# Patient Record
Sex: FEMALE | Race: WHITE
[De-identification: names, ages, dates, MRNs, and addresses within clinical notes are randomized per-mention and may not be internally consistent; named-entity substitution may affect disease eponyms.]

---

## 2017-01-19 LAB
INR PPP: 3.1 (ref 0.9–1.1)
PROTHROMBIN TIME: 34.7 SECONDS (ref 9–12)

## 2017-01-20 ENCOUNTER — HOSPITAL ENCOUNTER (OUTPATIENT)
Dept: HOSPITAL 45 - C.LABWYN | Age: 82
Discharge: HOME | End: 2017-01-20
Attending: NURSE PRACTITIONER
Payer: COMMERCIAL

## 2017-01-20 DIAGNOSIS — Z79.01: ICD-10-CM

## 2017-01-20 DIAGNOSIS — Z51.81: Primary | ICD-10-CM

## 2017-01-24 ENCOUNTER — HOSPITAL ENCOUNTER (OUTPATIENT)
Dept: HOSPITAL 45 - C.LABWYN | Age: 82
Discharge: HOME | End: 2017-01-24
Attending: NURSE PRACTITIONER
Payer: COMMERCIAL

## 2017-01-24 DIAGNOSIS — Z79.01: Primary | ICD-10-CM

## 2017-01-24 LAB
INR PPP: 2.8 (ref 0.9–1.1)
PROTHROMBIN TIME: 31.4 SECONDS (ref 9–12)

## 2017-02-07 ENCOUNTER — HOSPITAL ENCOUNTER (OUTPATIENT)
Dept: HOSPITAL 45 - C.LABWYN | Age: 82
Discharge: HOME | End: 2017-02-07
Attending: INTERNAL MEDICINE
Payer: COMMERCIAL

## 2017-02-07 DIAGNOSIS — Z79.01: ICD-10-CM

## 2017-02-07 DIAGNOSIS — Z51.81: Primary | ICD-10-CM

## 2017-02-07 LAB
INR PPP: 1.7 (ref 0.9–1.1)
PROTHROMBIN TIME: 18.8 SECONDS (ref 9–12)

## 2017-02-14 ENCOUNTER — HOSPITAL ENCOUNTER (OUTPATIENT)
Dept: HOSPITAL 45 - C.LABWYN | Age: 82
Discharge: HOME | End: 2017-02-14
Attending: NURSE PRACTITIONER
Payer: COMMERCIAL

## 2017-02-14 DIAGNOSIS — I48.91: Primary | ICD-10-CM

## 2017-02-14 LAB
INR PPP: 2.3 (ref 0.9–1.1)
PROTHROMBIN TIME: 25.4 SECONDS (ref 9–12)

## 2017-02-28 ENCOUNTER — HOSPITAL ENCOUNTER (OUTPATIENT)
Dept: HOSPITAL 45 - C.LABWYN | Age: 82
Discharge: HOME | End: 2017-02-28
Attending: NURSE PRACTITIONER
Payer: COMMERCIAL

## 2017-02-28 DIAGNOSIS — Z79.01: Primary | ICD-10-CM

## 2017-02-28 DIAGNOSIS — Z51.81: ICD-10-CM

## 2017-02-28 LAB
INR PPP: 2 (ref 0.9–1.1)
PROTHROMBIN TIME: 22.2 SECONDS (ref 9–12)

## 2017-03-02 ENCOUNTER — HOSPITAL ENCOUNTER (OUTPATIENT)
Dept: HOSPITAL 45 - C.LABWYN | Age: 82
Discharge: HOME | End: 2017-03-02
Attending: NURSE PRACTITIONER
Payer: COMMERCIAL

## 2017-03-02 DIAGNOSIS — I10: Primary | ICD-10-CM

## 2017-03-02 LAB
ANION GAP SERPL CALC-SCNC: 8 MMOL/L (ref 3–11)
BUN SERPL-MCNC: 11 MG/DL (ref 7–18)
BUN/CREAT SERPL: 14.4 (ref 10–20)
CALCIUM SERPL-MCNC: 8.3 MG/DL (ref 8.5–10.1)
CHLORIDE SERPL-SCNC: 106 MMOL/L (ref 98–107)
CO2 SERPL-SCNC: 30 MMOL/L (ref 21–32)
CREAT SERPL-MCNC: 0.75 MG/DL (ref 0.6–1.2)
GLUCOSE SERPL-MCNC: 88 MG/DL (ref 70–99)
POTASSIUM SERPL-SCNC: 3.6 MMOL/L (ref 3.5–5.1)
SODIUM SERPL-SCNC: 144 MMOL/L (ref 136–145)

## 2017-03-21 ENCOUNTER — HOSPITAL ENCOUNTER (OUTPATIENT)
Dept: HOSPITAL 45 - C.LABWYN | Age: 82
Discharge: HOME | End: 2017-03-21
Attending: INTERNAL MEDICINE
Payer: COMMERCIAL

## 2017-03-21 DIAGNOSIS — I10: ICD-10-CM

## 2017-03-21 DIAGNOSIS — I50.9: Primary | ICD-10-CM

## 2017-03-21 LAB
INR PPP: 2 (ref 0.9–1.1)
PROTHROMBIN TIME: 22 SECONDS (ref 9–12)

## 2017-04-06 ENCOUNTER — HOSPITAL ENCOUNTER (OUTPATIENT)
Dept: HOSPITAL 45 - C.LABWYN | Age: 82
Discharge: HOME | End: 2017-04-06
Attending: INTERNAL MEDICINE
Payer: COMMERCIAL

## 2017-04-06 DIAGNOSIS — Z79.01: Primary | ICD-10-CM

## 2017-04-06 LAB
INR PPP: 2.2 (ref 0.9–1.1)
PROTHROMBIN TIME: 23.8 SECONDS (ref 9–12)

## 2017-05-11 ENCOUNTER — HOSPITAL ENCOUNTER (OUTPATIENT)
Dept: HOSPITAL 45 - C.LABWYN | Age: 82
Discharge: HOME | End: 2017-05-11
Attending: INTERNAL MEDICINE
Payer: COMMERCIAL

## 2017-05-11 DIAGNOSIS — Z79.01: Primary | ICD-10-CM

## 2017-05-11 LAB
INR PPP: 2.1 (ref 0.9–1.1)
PROTHROMBIN TIME: 22.7 SECONDS (ref 9–12)

## 2017-06-01 ENCOUNTER — HOSPITAL ENCOUNTER (OUTPATIENT)
Dept: HOSPITAL 45 - C.LABWYN | Age: 82
Discharge: HOME | End: 2017-06-01
Attending: INTERNAL MEDICINE
Payer: COMMERCIAL

## 2017-06-01 DIAGNOSIS — I48.91: Primary | ICD-10-CM

## 2017-06-01 LAB
INR PPP: 1.6 (ref 0.9–1.1)
PROTHROMBIN TIME: 17.2 SECONDS (ref 9–12)

## 2017-06-08 ENCOUNTER — HOSPITAL ENCOUNTER (OUTPATIENT)
Dept: HOSPITAL 45 - C.LABWYN | Age: 82
Discharge: HOME | End: 2017-06-08
Attending: INTERNAL MEDICINE
Payer: COMMERCIAL

## 2017-06-08 DIAGNOSIS — F03.90: ICD-10-CM

## 2017-06-08 DIAGNOSIS — F32.9: ICD-10-CM

## 2017-06-08 DIAGNOSIS — I10: Primary | ICD-10-CM

## 2017-06-08 DIAGNOSIS — I50.9: ICD-10-CM

## 2017-06-08 DIAGNOSIS — N39.41: ICD-10-CM

## 2017-06-08 LAB
INR PPP: 1.7 (ref 0.9–1.1)
PROTHROMBIN TIME: 18.9 SECONDS (ref 9–12)

## 2017-06-15 ENCOUNTER — HOSPITAL ENCOUNTER (OUTPATIENT)
Dept: HOSPITAL 45 - C.LABWYN | Age: 82
Discharge: HOME | End: 2017-06-15
Attending: INTERNAL MEDICINE
Payer: COMMERCIAL

## 2017-06-15 DIAGNOSIS — Z51.81: Primary | ICD-10-CM

## 2017-06-15 DIAGNOSIS — Z79.01: ICD-10-CM

## 2017-06-15 LAB
INR PPP: 1.7 (ref 0.9–1.1)
PROTHROMBIN TIME: 18.7 SECONDS (ref 9–12)

## 2017-07-06 ENCOUNTER — HOSPITAL ENCOUNTER (OUTPATIENT)
Dept: HOSPITAL 45 - C.LABWYN | Age: 82
Discharge: HOME | End: 2017-07-06
Attending: INTERNAL MEDICINE
Payer: COMMERCIAL

## 2017-07-06 DIAGNOSIS — I48.91: Primary | ICD-10-CM

## 2017-07-06 LAB
INR PPP: 1.6 (ref 0.9–1.1)
PROTHROMBIN TIME: 17.4 SECONDS (ref 9–12)

## 2017-07-20 ENCOUNTER — HOSPITAL ENCOUNTER (OUTPATIENT)
Dept: HOSPITAL 45 - C.LABWYN | Age: 82
Discharge: HOME | End: 2017-07-20
Attending: NURSE PRACTITIONER
Payer: COMMERCIAL

## 2017-07-20 DIAGNOSIS — I50.9: Primary | ICD-10-CM

## 2017-07-20 LAB
ANION GAP SERPL CALC-SCNC: 5 MMOL/L (ref 3–11)
BUN SERPL-MCNC: 14 MG/DL (ref 7–18)
BUN/CREAT SERPL: 16 (ref 10–20)
CALCIUM SERPL-MCNC: 8.6 MG/DL (ref 8.5–10.1)
CHLORIDE SERPL-SCNC: 108 MMOL/L (ref 98–107)
CO2 SERPL-SCNC: 30 MMOL/L (ref 21–32)
CREAT SERPL-MCNC: 0.85 MG/DL (ref 0.6–1.2)
EOSINOPHIL NFR BLD AUTO: 268 K/UL (ref 130–400)
GLUCOSE SERPL-MCNC: 84 MG/DL (ref 70–99)
HCT VFR BLD CALC: 39.3 % (ref 37–47)
MCH RBC QN AUTO: 28.3 PG (ref 25–34)
MCHC RBC AUTO-ENTMCNC: 31.6 G/DL (ref 32–36)
MCV RBC AUTO: 89.7 FL (ref 80–100)
PMV BLD AUTO: 9.6 FL (ref 7.4–10.4)
POTASSIUM SERPL-SCNC: 3.6 MMOL/L (ref 3.5–5.1)
RBC # BLD AUTO: 4.38 M/UL (ref 4.2–5.4)
SODIUM SERPL-SCNC: 143 MMOL/L (ref 136–145)
WBC # BLD AUTO: 8.33 K/UL (ref 4.8–10.8)

## 2017-08-18 ENCOUNTER — HOSPITAL ENCOUNTER (OUTPATIENT)
Dept: HOSPITAL 45 - C.LABWYN | Age: 82
Discharge: HOME | End: 2017-08-18
Attending: INTERNAL MEDICINE
Payer: COMMERCIAL

## 2017-08-18 DIAGNOSIS — I48.91: Primary | ICD-10-CM

## 2017-08-18 LAB
INR PPP: 2.3 (ref 0.9–1.1)
PROTHROMBIN TIME: 25.7 SECONDS (ref 9–12)

## 2017-08-25 ENCOUNTER — HOSPITAL ENCOUNTER (OUTPATIENT)
Dept: HOSPITAL 45 - C.LABWYN | Age: 82
Discharge: HOME | End: 2017-08-25
Attending: INTERNAL MEDICINE
Payer: COMMERCIAL

## 2017-08-25 DIAGNOSIS — Z51.81: Primary | ICD-10-CM

## 2017-08-25 DIAGNOSIS — Z79.01: ICD-10-CM

## 2017-08-25 LAB
INR PPP: 2.5 (ref 0.9–1.1)
PROTHROMBIN TIME: 27.6 SECONDS (ref 9–12)

## 2017-09-07 ENCOUNTER — HOSPITAL ENCOUNTER (OUTPATIENT)
Dept: HOSPITAL 45 - C.LABWYN | Age: 82
Discharge: HOME | End: 2017-09-07
Attending: NURSE PRACTITIONER
Payer: COMMERCIAL

## 2017-09-07 DIAGNOSIS — Z79.01: Primary | ICD-10-CM

## 2017-09-07 LAB
INR PPP: 2.2 (ref 0.9–1.1)
PROTHROMBIN TIME: 24.1 SECONDS (ref 9–12)

## 2017-09-28 ENCOUNTER — HOSPITAL ENCOUNTER (OUTPATIENT)
Dept: HOSPITAL 45 - C.LABWYN | Age: 82
Discharge: HOME | End: 2017-09-28
Attending: INTERNAL MEDICINE
Payer: COMMERCIAL

## 2017-09-28 DIAGNOSIS — Z51.81: Primary | ICD-10-CM

## 2017-09-28 DIAGNOSIS — Z79.01: ICD-10-CM

## 2017-09-28 LAB
INR PPP: 2.2 (ref 0.9–1.1)
PROTHROMBIN TIME: 24.7 SECONDS (ref 9–12)

## 2017-10-12 ENCOUNTER — HOSPITAL ENCOUNTER (OUTPATIENT)
Dept: HOSPITAL 45 - C.LABWYN | Age: 82
Discharge: HOME | End: 2017-10-12
Attending: INTERNAL MEDICINE
Payer: COMMERCIAL

## 2017-10-12 DIAGNOSIS — D64.9: Primary | ICD-10-CM

## 2017-10-12 DIAGNOSIS — M81.0: ICD-10-CM

## 2017-10-12 LAB
ANION GAP SERPL CALC-SCNC: 7 MMOL/L (ref 3–11)
BUN SERPL-MCNC: 11 MG/DL (ref 7–18)
BUN/CREAT SERPL: 15 (ref 10–20)
CALCIUM SERPL-MCNC: 8.2 MG/DL (ref 8.5–10.1)
CHLORIDE SERPL-SCNC: 102 MMOL/L (ref 98–107)
CO2 SERPL-SCNC: 29 MMOL/L (ref 21–32)
CREAT SERPL-MCNC: 0.73 MG/DL (ref 0.6–1.2)
EOSINOPHIL NFR BLD AUTO: 305 K/UL (ref 130–400)
GLUCOSE SERPL-MCNC: 99 MG/DL (ref 70–99)
HCT VFR BLD CALC: 39.5 % (ref 37–47)
MCH RBC QN AUTO: 27.8 PG (ref 25–34)
MCHC RBC AUTO-ENTMCNC: 31.4 G/DL (ref 32–36)
MCV RBC AUTO: 88.6 FL (ref 80–100)
PMV BLD AUTO: 9.6 FL (ref 7.4–10.4)
POTASSIUM SERPL-SCNC: 3.3 MMOL/L (ref 3.5–5.1)
RBC # BLD AUTO: 4.46 M/UL (ref 4.2–5.4)
SODIUM SERPL-SCNC: 138 MMOL/L (ref 136–145)
WBC # BLD AUTO: 11.16 K/UL (ref 4.8–10.8)

## 2017-10-19 ENCOUNTER — HOSPITAL ENCOUNTER (OUTPATIENT)
Dept: HOSPITAL 45 - C.LABWYN | Age: 82
Discharge: NURSING, REHAB OR LONG-TERM CARE | End: 2017-10-19
Attending: INTERNAL MEDICINE
Payer: COMMERCIAL

## 2017-10-19 DIAGNOSIS — Z79.01: Primary | ICD-10-CM

## 2017-10-19 LAB
INR PPP: 2.7 (ref 0.9–1.1)
PROTHROMBIN TIME: 30.3 SECONDS (ref 9–12)

## 2017-10-22 ENCOUNTER — HOSPITAL ENCOUNTER (EMERGENCY)
Dept: HOSPITAL 45 - C.EDB | Age: 82
Discharge: HOME | End: 2017-10-22
Payer: COMMERCIAL

## 2017-10-22 VITALS
BODY MASS INDEX: 37.83 KG/M2 | WEIGHT: 227.08 LBS | BODY MASS INDEX: 37.83 KG/M2 | WEIGHT: 227.08 LBS | HEIGHT: 65 IN | HEIGHT: 65 IN

## 2017-10-22 VITALS
DIASTOLIC BLOOD PRESSURE: 98 MMHG | SYSTOLIC BLOOD PRESSURE: 149 MMHG | OXYGEN SATURATION: 96 % | HEART RATE: 74 BPM | TEMPERATURE: 99.32 F

## 2017-10-22 DIAGNOSIS — Z79.82: ICD-10-CM

## 2017-10-22 DIAGNOSIS — C44.510: ICD-10-CM

## 2017-10-22 DIAGNOSIS — R04.0: Primary | ICD-10-CM

## 2017-10-22 DIAGNOSIS — Z87.891: ICD-10-CM

## 2017-10-22 DIAGNOSIS — Z79.01: ICD-10-CM

## 2017-10-22 DIAGNOSIS — I50.9: ICD-10-CM

## 2017-10-22 NOTE — EMERGENCY ROOM VISIT NOTE
History


Report prepared by Espinoza:  Rosalia Sinha


Under the Supervision of:  Dr. Sreedhar Morales M.D.


First contact with patient:  20:30


Chief Complaint:  NOSE BLEED (MINOR)


Stated Complaint:  nosebleed





History of Present Illness


The patient is a 86 year old female who presents to the Emergency Room with 

complaints of a sudden nosebleed beginning at 1500 today. The patient denies 

chest pain and shortness of breath, but reports having a slight headache.  The 

patient states that she takes Coumadin.  She is not sure why she is on 

Coumadin.  The nosebleed has stopped.  She has no complaints at this time.





   Source of History:  patient


   Onset:  1500 today


   Position:  nose


   Quality:  other (bleed)


   Timing:  other (sudden)


   Associated Symptoms:  + headache, No chest pain, No SOB





Review of Systems


See HPI for pertinent positives & negatives. A total of 10 systems reviewed and 

were otherwise negative.





Past Medical & Surgical


Medical Problems:


(1) Atrial fibrillation


(2) Basal cell carcinoma of face


(3) CHF


(4) deconditioning


(5) Generalized osteoarthritis


(6) Guillain-Barr  syndrome


(7) History of appendectomy


(8) History of cholecystectomy


(9) Replacement of total knee joint


(10) S/P esophageal dilitation 6-9-11


(11) Steroid therapy


(12) SUBTHERPEUTIC INR


(13) urinary retention








Family History





Patient reports no known family medical history.





Social History


Smoking Status:  Former Smoker


Alcohol Use:  none


Drug Use:  none


Marital Status:  


Housing Status:  nursing home


Occupation Status:  retired





Current/Historical Medications


Scheduled


Aspirin (Aspirin Ec), 81 MG PO DAILY


Calcium Carbonate (Os-Marlon 500), 1 TAB PO DAILY


Cholecalciferol (Vitamin D3), 1,000 UNITS PO DAILY


Citalopram (Citalopram Hydrobromide), 40 MG PO DAILY


Cranberry (Vaccinium Macrocarp (Cranberry Fruit), 475 MG PO BID


Cyanocobalamin (Vitamin B-12 1000 Mcg), 1,000 MCG PO DAILY


Digoxin (Digoxin), 0.125 MG PO DAILY


Diltiazem Hcl (Cardizem), 30 MG PO QID


Docusate Sodium (Docusate Sodium), 100 MG PO BID


Donepezil HCl (Aricept), 5 MG PO DAILY


Furosemide (Furosemide), 20 MG PO DAILY


Polyethylene Glycol 3350 (Miralax), 17 GM PO DAILY


Prednisone Tab (Prednisone), 10 MG PO DAILY


Sennosides-Docusate Sodium (Senna S), 1 TAB OP DAILY


Warfarin Sodium (Warfarin Sodium), 7.5 MG PO DAILY





Scheduled PRN


Acetaminophen (Tylenol), 325 MG PO Q4 PRN for Pain


Alum & Mag Hydrox-Simethicone (Mylanta), 30 ML PO QID PRN for Heartburn


Lorazepam (Ativan), 0.5 MG PO BID PRN for Anxiety





Allergies


Coded Allergies:  


     Nitrofurantoin (Verified  Allergy, Intermediate, MACROBID-HIVES, 10/22/17)


     Colchicine (Verified  Allergy, Unknown, UNKNOWN, 10/22/17)


     Zolpidem (Verified  Adverse Reaction, Mild, CONFUSION, HALLUCINATIONS, 10/

22/17)





Physical Exam


Vital Signs











  Date Time  Temp Pulse Resp B/P (MAP) Pulse Ox O2 Delivery O2 Flow Rate FiO2


 


10/22/17 20:23 37.4 74  149/98 96 Room Air  











Physical Exam





Constitutional: Vital signs reviewed.


Eyes: Pupils are equal round reactive to light.  Conjunctiva are noninjected.  


ENT: Pharynx is clear without erythema or exudate.  Mucous membranes are moist. 

No active bleeding from the nostrils.   Neck supple without meningeal signs. 


Respiratory: Clear to auscultation bilaterally.  Breath sounds are equal 

bilaterally. 


Cardiovascular: Regular rate and rhythm.  No rubs or gallops.


GI: Soft, nondistended and nontender.  Bowel sounds are present.


Musculoskeletal: No peripheral edema.  No lower extremity tenderness. 


Integumentary: No cyanosis.


Neurological: The patient is awake and alert.  No focal deficits.


Psychiatric: Normal affect.





Medical Decision & Procedures


ED Course


2030: The patient was evaluated in room B8. A complete history and physical 

exam was performed.





2040: Upon reevaluation, the patient appeared to have improvement of her 

symptoms. I discussed tonight's findings with her. She verbalized agreement of 

the treatment plan. She was discharged home.





Medical Decision


This is a 86-year-old female on Coumadin who presents with a nosebleed.  I did 

perform a limited focused review of portions of the patient's old chart on the 

electronic medical record. The patient's INR was 2.7 on the 19th. 





I assessed the patient as noted above.  She has no evidence of active bleeding 

at this time.  Her INR was 2.7 just several days ago.  There is not even dried 

blood in her nose at this time.  There is no area for cautery.  At this time I 

did not feel packing was indicated as she has complete resolution of her 

symptoms.  I did discuss out-of-control nosebleeds in the future with her and 

as well as her son due to her dementia.  She was discharged home in good 

condition.  She and her son were told that should she have recurrent symptoms 

she may require packing in the future.





Medication Reconcilliation


Current Medication List:  was personally reviewed by me





Blood Pressure Screening


Patient's blood pressure:  Elevated blood pressure


Blood pressure disposition:  Referred to PCP





Impression





 Primary Impression:  


 Epistaxis


 Additional Impression:  


 Anticoagulated on Coumadin





Scribe Attestation


The scribe's documentation has been prepared under my direct and personally 

reviewed by me in its entirety. I confirm that the note above accurately 

reflects all work, treatment, procedures, and medical decision making performed 

by me.





Departure Information


Dispostion


Home / Self-Care





Referrals


[facility] (PCP)





Forms


HOME CARE DOCUMENTATION FORM,                                                 

               IMPORTANT VISIT INFORMATION, WORK / SCHOOL INSTRUCTIONS





Patient Instructions


ED Nosebleed, My [facility]





Additional Instructions





You have been examined and treated today on an emergency basis only. This is 

not a substitute for, or an effort to provide, complete comprehensive medical 

care. It is impossible to recognize and treat all injuries or illnesses in a 

single emergency department visit. It is therefore important that you follow up 

closely with your physician.  Call as soon as possible for an appointment.  

Return for worsening symptoms or if you develop chest pain, shortness breath, 

lightheadedness, rectal bleeding, tarry stools or any other concerning 

symptoms.  If bleeding recurs, hold continuous pressure for at least 30 

minutes.  If unable to control bleeding return to ED for potential nasal 

packing.





Problem Qualifiers

## 2017-11-09 ENCOUNTER — HOSPITAL ENCOUNTER (OUTPATIENT)
Dept: HOSPITAL 45 - C.LABWYN | Age: 82
Discharge: HOME | End: 2017-11-09
Attending: INTERNAL MEDICINE
Payer: COMMERCIAL

## 2017-11-09 DIAGNOSIS — I48.91: Primary | ICD-10-CM

## 2017-11-09 LAB
INR PPP: 2 (ref 0.9–1.1)
PROTHROMBIN TIME: 21.7 SECONDS (ref 9–12)

## 2017-11-25 ENCOUNTER — HOSPITAL ENCOUNTER (OUTPATIENT)
Dept: HOSPITAL 45 - C.LABSPEC | Age: 82
Discharge: HOME | End: 2017-11-25
Attending: INTERNAL MEDICINE
Payer: COMMERCIAL

## 2017-11-25 DIAGNOSIS — R41.0: Primary | ICD-10-CM

## 2017-11-30 ENCOUNTER — HOSPITAL ENCOUNTER (OUTPATIENT)
Dept: HOSPITAL 45 - C.LABWYN | Age: 82
Discharge: HOME | End: 2017-11-30
Attending: INTERNAL MEDICINE
Payer: COMMERCIAL

## 2017-11-30 DIAGNOSIS — Z51.81: ICD-10-CM

## 2017-11-30 DIAGNOSIS — Z79.01: Primary | ICD-10-CM

## 2017-11-30 LAB
INR PPP: 3.9 (ref 0.9–1.1)
PROTHROMBIN TIME: 44.4 SECONDS (ref 9–12)

## 2017-12-07 ENCOUNTER — HOSPITAL ENCOUNTER (OUTPATIENT)
Dept: HOSPITAL 45 - C.LABWYN | Age: 82
Discharge: HOME | End: 2017-12-07
Attending: INTERNAL MEDICINE
Payer: COMMERCIAL

## 2017-12-07 DIAGNOSIS — Z79.01: Primary | ICD-10-CM

## 2017-12-07 LAB
INR PPP: 1.6 (ref 0.9–1.1)
PROTHROMBIN TIME: 16.3 SECONDS (ref 9–12)

## 2017-12-21 ENCOUNTER — HOSPITAL ENCOUNTER (OUTPATIENT)
Dept: HOSPITAL 45 - C.LABWYN | Age: 82
Discharge: HOME | End: 2017-12-21
Attending: INTERNAL MEDICINE
Payer: COMMERCIAL

## 2017-12-21 DIAGNOSIS — Z79.01: ICD-10-CM

## 2017-12-21 DIAGNOSIS — Z51.81: Primary | ICD-10-CM

## 2017-12-21 LAB
INR PPP: 2 (ref 0.9–1.1)
PROTHROMBIN TIME: 21.2 SECONDS (ref 9–12)

## 2018-01-11 ENCOUNTER — HOSPITAL ENCOUNTER (OUTPATIENT)
Dept: HOSPITAL 45 - C.LABWYN | Age: 83
Discharge: HOME | End: 2018-01-11
Attending: INTERNAL MEDICINE
Payer: COMMERCIAL

## 2018-01-11 DIAGNOSIS — Z51.81: ICD-10-CM

## 2018-01-11 DIAGNOSIS — Z79.01: Primary | ICD-10-CM

## 2018-01-11 LAB — INR PPP: 2.2 (ref 0.9–1.1)

## 2018-02-01 ENCOUNTER — HOSPITAL ENCOUNTER (OUTPATIENT)
Dept: HOSPITAL 45 - C.LABWYN | Age: 83
Discharge: HOME | End: 2018-02-01
Attending: INTERNAL MEDICINE
Payer: COMMERCIAL

## 2018-02-01 DIAGNOSIS — Z79.01: Primary | ICD-10-CM

## 2018-02-01 DIAGNOSIS — Z51.81: ICD-10-CM

## 2018-02-01 LAB — INR PPP: 1.7 (ref 0.9–1.1)

## 2018-02-06 ENCOUNTER — HOSPITAL ENCOUNTER (OUTPATIENT)
Dept: HOSPITAL 45 - C.LABWYN | Age: 83
Discharge: HOME | End: 2018-02-06
Attending: NURSE PRACTITIONER
Payer: COMMERCIAL

## 2018-02-06 DIAGNOSIS — Z51.81: ICD-10-CM

## 2018-02-06 DIAGNOSIS — Z79.01: Primary | ICD-10-CM

## 2018-02-06 LAB — INR PPP: 2.2 (ref 0.9–1.1)

## 2018-02-13 ENCOUNTER — HOSPITAL ENCOUNTER (OUTPATIENT)
Dept: HOSPITAL 45 - C.LABWYN | Age: 83
Discharge: HOME | End: 2018-02-13
Attending: NURSE PRACTITIONER
Payer: COMMERCIAL

## 2018-02-13 DIAGNOSIS — Z51.81: Primary | ICD-10-CM

## 2018-02-13 DIAGNOSIS — Z79.01: ICD-10-CM

## 2018-02-13 LAB — INR PPP: 2.3 (ref 0.9–1.1)

## 2018-03-01 ENCOUNTER — HOSPITAL ENCOUNTER (OUTPATIENT)
Dept: HOSPITAL 45 - C.LABWYN | Age: 83
Discharge: HOME | End: 2018-03-01
Attending: NURSE PRACTITIONER
Payer: COMMERCIAL

## 2018-03-01 DIAGNOSIS — R63.2: ICD-10-CM

## 2018-03-01 DIAGNOSIS — M81.0: Primary | ICD-10-CM

## 2018-03-01 DIAGNOSIS — I50.9: ICD-10-CM

## 2018-03-01 LAB
BUN SERPL-MCNC: 15 MG/DL (ref 7–18)
CALCIUM SERPL-MCNC: 8.6 MG/DL (ref 8.5–10.1)
CO2 SERPL-SCNC: 28 MMOL/L (ref 21–32)
CREAT SERPL-MCNC: 0.89 MG/DL (ref 0.6–1.2)
EOSINOPHIL NFR BLD AUTO: 377 K/UL (ref 130–400)
GLUCOSE SERPL-MCNC: 92 MG/DL (ref 70–99)
HBA1C MFR BLD: 6 % (ref 4.5–5.6)
HCT VFR BLD CALC: 38.1 % (ref 37–47)
HGB BLD-MCNC: 11.7 G/DL (ref 12–16)
MCH RBC QN AUTO: 25.7 PG (ref 25–34)
MCHC RBC AUTO-ENTMCNC: 30.7 G/DL (ref 32–36)
MCV RBC AUTO: 83.7 FL (ref 80–100)
PMV BLD AUTO: 9.8 FL (ref 7.4–10.4)
POTASSIUM SERPL-SCNC: 3.9 MMOL/L (ref 3.5–5.1)
RED CELL DISTRIBUTION WIDTH CV: 19 % (ref 11.5–14.5)
RED CELL DISTRIBUTION WIDTH SD: 57.8 FL (ref 36.4–46.3)
SODIUM SERPL-SCNC: 139 MMOL/L (ref 136–145)
WBC # BLD AUTO: 10.69 K/UL (ref 4.8–10.8)

## 2018-03-06 ENCOUNTER — HOSPITAL ENCOUNTER (OUTPATIENT)
Dept: HOSPITAL 45 - C.LABWYN | Age: 83
Discharge: HOME | End: 2018-03-06
Attending: INTERNAL MEDICINE
Payer: COMMERCIAL

## 2018-03-06 DIAGNOSIS — Z79.01: Primary | ICD-10-CM

## 2018-03-06 DIAGNOSIS — Z51.81: ICD-10-CM

## 2018-03-06 LAB — INR PPP: 2.2 (ref 0.9–1.1)

## 2018-03-27 ENCOUNTER — HOSPITAL ENCOUNTER (OUTPATIENT)
Dept: HOSPITAL 45 - C.LABWYN | Age: 83
Discharge: HOME | End: 2018-03-27
Attending: INTERNAL MEDICINE
Payer: COMMERCIAL

## 2018-03-27 DIAGNOSIS — Z79.01: Primary | ICD-10-CM

## 2018-03-27 LAB — INR PPP: 2.2 (ref 0.9–1.1)

## 2018-03-29 ENCOUNTER — HOSPITAL ENCOUNTER (OUTPATIENT)
Dept: HOSPITAL 45 - C.LABWYN | Age: 83
Discharge: HOME | End: 2018-03-29
Attending: INTERNAL MEDICINE
Payer: COMMERCIAL

## 2018-03-29 DIAGNOSIS — E03.9: Primary | ICD-10-CM

## 2018-04-07 NOTE — CODING QUERY NO DIAGNOSIS
TREATMENT RENDERED WITHOUT A DIAGNOSIS                                                  

 31



To promote full compliance with coding requirements relating to patient care, physician 
participation is requested in all cases of  uncertainty.  Please assist us with 
providing a diagnosis/symptom for the test(s) below:



A diagnosis/symptom was not documented on your Order.  A valid diagnosis/symptom is 
required to bill all insurances.



**Please remember that we are unable to code a diagnosis of rule out, probable, possible, 
questionable, or suspected.  





DOS 3/29/18

Tests that require a diagnosis:

* TSH                                 DIAGNOSIS:









Provider Signature: _____________________________ Date: _________



Thank you  

Tasha Ledezma

[employer]

Phone:  [phone]

Fax:  [phone]



Once completed, please kindly fax back to [phone]



For questions please call [phone]

## 2018-04-17 ENCOUNTER — HOSPITAL ENCOUNTER (OUTPATIENT)
Dept: HOSPITAL 45 - C.LABWYN | Age: 83
Discharge: HOME | End: 2018-04-17
Attending: INTERNAL MEDICINE
Payer: COMMERCIAL

## 2018-04-17 DIAGNOSIS — Z79.01: Primary | ICD-10-CM

## 2018-04-17 DIAGNOSIS — Z51.81: ICD-10-CM

## 2018-04-17 LAB — INR PPP: 1.9 (ref 0.9–1.1)

## 2018-05-01 ENCOUNTER — HOSPITAL ENCOUNTER (OUTPATIENT)
Dept: HOSPITAL 45 - C.LABWYN | Age: 83
Discharge: HOME | End: 2018-05-01
Attending: INTERNAL MEDICINE
Payer: COMMERCIAL

## 2018-05-01 DIAGNOSIS — Z79.01: Primary | ICD-10-CM

## 2018-05-01 LAB — INR PPP: 2.5 (ref 0.9–1.1)

## 2018-05-22 ENCOUNTER — HOSPITAL ENCOUNTER (OUTPATIENT)
Dept: HOSPITAL 45 - C.LABWYN | Age: 83
Discharge: HOME | End: 2018-05-22
Attending: INTERNAL MEDICINE
Payer: COMMERCIAL

## 2018-05-22 DIAGNOSIS — Z51.81: ICD-10-CM

## 2018-05-22 DIAGNOSIS — Z79.01: Primary | ICD-10-CM

## 2018-05-22 LAB — INR PPP: 2.1 (ref 0.9–1.1)

## 2018-07-31 ENCOUNTER — HOSPITAL ENCOUNTER (OUTPATIENT)
Dept: HOSPITAL 45 - C.LABWYN | Age: 83
Discharge: HOME | End: 2018-07-31
Attending: INTERNAL MEDICINE
Payer: COMMERCIAL

## 2018-07-31 DIAGNOSIS — Z79.01: Primary | ICD-10-CM

## 2018-07-31 LAB — INR PPP: 2.7 (ref 0.9–1.1)

## 2018-08-21 ENCOUNTER — HOSPITAL ENCOUNTER (OUTPATIENT)
Dept: HOSPITAL 45 - C.LABWYN | Age: 83
Discharge: HOME | End: 2018-08-21
Attending: INTERNAL MEDICINE
Payer: COMMERCIAL

## 2018-08-21 DIAGNOSIS — Z79.01: Primary | ICD-10-CM

## 2018-08-21 LAB — INR PPP: 2 (ref 0.9–1.1)
